# Patient Record
Sex: MALE | Race: OTHER
[De-identification: names, ages, dates, MRNs, and addresses within clinical notes are randomized per-mention and may not be internally consistent; named-entity substitution may affect disease eponyms.]

---

## 2021-04-11 ENCOUNTER — HOSPITAL ENCOUNTER (INPATIENT)
Dept: HOSPITAL 54 - ER | Age: 71
LOS: 5 days | Discharge: LEFT BEFORE DISCHARGE | DRG: 644 | End: 2021-04-16
Attending: INTERNAL MEDICINE | Admitting: NURSE PRACTITIONER
Payer: MEDICARE

## 2021-04-11 VITALS — BODY MASS INDEX: 36.94 KG/M2 | HEIGHT: 70 IN | WEIGHT: 258 LBS

## 2021-04-11 DIAGNOSIS — E11.9: ICD-10-CM

## 2021-04-11 DIAGNOSIS — D64.9: ICD-10-CM

## 2021-04-11 DIAGNOSIS — Z79.84: ICD-10-CM

## 2021-04-11 DIAGNOSIS — E83.42: ICD-10-CM

## 2021-04-11 DIAGNOSIS — Z20.822: ICD-10-CM

## 2021-04-11 DIAGNOSIS — E66.2: ICD-10-CM

## 2021-04-11 DIAGNOSIS — R91.8: ICD-10-CM

## 2021-04-11 DIAGNOSIS — Z87.891: ICD-10-CM

## 2021-04-11 DIAGNOSIS — J90: ICD-10-CM

## 2021-04-11 DIAGNOSIS — I16.0: ICD-10-CM

## 2021-04-11 DIAGNOSIS — E78.5: ICD-10-CM

## 2021-04-11 DIAGNOSIS — E22.2: Primary | ICD-10-CM

## 2021-04-11 LAB
BASOPHILS # BLD AUTO: 0.1 /CMM (ref 0–0.2)
BASOPHILS NFR BLD AUTO: 0.9 % (ref 0–2)
BUN SERPL-MCNC: 8 MG/DL (ref 7–18)
CALCIUM SERPL-MCNC: 8.7 MG/DL (ref 8.5–10.1)
CHLORIDE SERPL-SCNC: 81 MMOL/L (ref 98–107)
CO2 SERPL-SCNC: 20 MMOL/L (ref 21–32)
CREAT SERPL-MCNC: 0.7 MG/DL (ref 0.6–1.3)
EOSINOPHIL NFR BLD AUTO: 1.8 % (ref 0–6)
GLUCOSE SERPL-MCNC: 141 MG/DL (ref 74–106)
HCT VFR BLD AUTO: 41 % (ref 39–51)
HGB BLD-MCNC: 13.6 G/DL (ref 13.5–17.5)
LYMPHOCYTES NFR BLD AUTO: 1.4 /CMM (ref 0.8–4.8)
LYMPHOCYTES NFR BLD AUTO: 24.4 % (ref 20–44)
MCHC RBC AUTO-ENTMCNC: 34 G/DL (ref 31–36)
MCV RBC AUTO: 88 FL (ref 80–96)
MONOCYTES NFR BLD AUTO: 0.6 /CMM (ref 0.1–1.3)
MONOCYTES NFR BLD AUTO: 11.4 % (ref 2–12)
NEUTROPHILS # BLD AUTO: 3.5 /CMM (ref 1.8–8.9)
NEUTROPHILS NFR BLD AUTO: 61.5 % (ref 43–81)
PLATELET # BLD AUTO: 233 /CMM (ref 150–450)
POTASSIUM SERPL-SCNC: 3.8 MMOL/L (ref 3.5–5.1)
RBC # BLD AUTO: 4.63 MIL/UL (ref 4.5–6)
SODIUM SERPL-SCNC: 116 MMOL/L (ref 136–145)
WBC NRBC COR # BLD AUTO: 5.6 K/UL (ref 4.3–11)

## 2021-04-11 PROCEDURE — G0378 HOSPITAL OBSERVATION PER HR: HCPCS

## 2021-04-11 PROCEDURE — C9803 HOPD COVID-19 SPEC COLLECT: HCPCS

## 2021-04-11 NOTE — NUR
BIBRA FOR C/O HIGH BP AND OCCASIONAL SOB. DENIED H/A, DIZZINESS OR CP. REPORTED 
TAKING LOSARTAN BID AND HIS LAST DOSE WAS THIS MORNING, PT WAS PLACED IN RM 4 
ER AND WAS PLACED ON A MONITOR

## 2021-04-12 VITALS — DIASTOLIC BLOOD PRESSURE: 83 MMHG | SYSTOLIC BLOOD PRESSURE: 146 MMHG

## 2021-04-12 VITALS — DIASTOLIC BLOOD PRESSURE: 87 MMHG | SYSTOLIC BLOOD PRESSURE: 159 MMHG

## 2021-04-12 VITALS — DIASTOLIC BLOOD PRESSURE: 89 MMHG | SYSTOLIC BLOOD PRESSURE: 157 MMHG

## 2021-04-12 VITALS — SYSTOLIC BLOOD PRESSURE: 186 MMHG | DIASTOLIC BLOOD PRESSURE: 91 MMHG

## 2021-04-12 VITALS — DIASTOLIC BLOOD PRESSURE: 88 MMHG | SYSTOLIC BLOOD PRESSURE: 174 MMHG

## 2021-04-12 VITALS — DIASTOLIC BLOOD PRESSURE: 96 MMHG | SYSTOLIC BLOOD PRESSURE: 199 MMHG

## 2021-04-12 VITALS — SYSTOLIC BLOOD PRESSURE: 162 MMHG | DIASTOLIC BLOOD PRESSURE: 80 MMHG

## 2021-04-12 LAB
BASOPHILS # BLD AUTO: 0 /CMM (ref 0–0.2)
BASOPHILS NFR BLD AUTO: 0.3 % (ref 0–2)
BILIRUB UR QL STRIP: NEGATIVE
BUN SERPL-MCNC: 7 MG/DL (ref 7–18)
BUN SERPL-MCNC: 7 MG/DL (ref 7–18)
BUN SERPL-MCNC: 8 MG/DL (ref 7–18)
BUN SERPL-MCNC: 9 MG/DL (ref 7–18)
CALCIUM SERPL-MCNC: 7.6 MG/DL (ref 8.5–10.1)
CALCIUM SERPL-MCNC: 7.6 MG/DL (ref 8.5–10.1)
CALCIUM SERPL-MCNC: 7.7 MG/DL (ref 8.5–10.1)
CALCIUM SERPL-MCNC: 8.1 MG/DL (ref 8.5–10.1)
CHLORIDE SERPL-SCNC: 81 MMOL/L (ref 98–107)
CHLORIDE SERPL-SCNC: 83 MMOL/L (ref 98–107)
CHLORIDE SERPL-SCNC: 84 MMOL/L (ref 98–107)
CHLORIDE SERPL-SCNC: 84 MMOL/L (ref 98–107)
CHOLEST SERPL-MCNC: 90 MG/DL (ref ?–200)
CO2 SERPL-SCNC: 21 MMOL/L (ref 21–32)
CO2 SERPL-SCNC: 23 MMOL/L (ref 21–32)
CO2 SERPL-SCNC: 24 MMOL/L (ref 21–32)
CO2 SERPL-SCNC: 25 MMOL/L (ref 21–32)
COLOR UR: YELLOW
CREAT SERPL-MCNC: 0.6 MG/DL (ref 0.6–1.3)
CREAT SERPL-MCNC: 0.7 MG/DL (ref 0.6–1.3)
EOSINOPHIL NFR BLD AUTO: 0.3 % (ref 0–6)
GLUCOSE SERPL-MCNC: 136 MG/DL (ref 74–106)
GLUCOSE SERPL-MCNC: 153 MG/DL (ref 74–106)
GLUCOSE SERPL-MCNC: 157 MG/DL (ref 74–106)
GLUCOSE SERPL-MCNC: 178 MG/DL (ref 74–106)
GLUCOSE UR STRIP-MCNC: NEGATIVE MG/DL
HCT VFR BLD AUTO: 40 % (ref 39–51)
HDLC SERPL-MCNC: 51 MG/DL (ref 40–60)
HGB BLD-MCNC: 13.6 G/DL (ref 13.5–17.5)
LDLC SERPL DIRECT ASSAY-MCNC: 28 MG/DL (ref 0–99)
LEUKOCYTE ESTERASE UR QL STRIP: NEGATIVE
LYMPHOCYTES NFR BLD AUTO: 0.7 /CMM (ref 0.8–4.8)
LYMPHOCYTES NFR BLD AUTO: 8.8 % (ref 20–44)
MAGNESIUM SERPL-MCNC: 1.7 MG/DL (ref 1.8–2.4)
MAGNESIUM SERPL-MCNC: 2.1 MG/DL (ref 1.8–2.4)
MCHC RBC AUTO-ENTMCNC: 34 G/DL (ref 31–36)
MCV RBC AUTO: 88 FL (ref 80–96)
MONOCYTES NFR BLD AUTO: 0.5 /CMM (ref 0.1–1.3)
MONOCYTES NFR BLD AUTO: 7.2 % (ref 2–12)
NEUTROPHILS # BLD AUTO: 6.2 /CMM (ref 1.8–8.9)
NEUTROPHILS NFR BLD AUTO: 83.4 % (ref 43–81)
NITRITE UR QL STRIP: NEGATIVE
PH UR STRIP: 6 [PH] (ref 5–8)
PHOSPHATE SERPL-MCNC: 2.9 MG/DL (ref 2.5–4.9)
PHOSPHATE SERPL-MCNC: 2.9 MG/DL (ref 2.5–4.9)
PLATELET # BLD AUTO: 226 /CMM (ref 150–450)
POTASSIUM SERPL-SCNC: 3.5 MMOL/L (ref 3.5–5.1)
POTASSIUM SERPL-SCNC: 3.5 MMOL/L (ref 3.5–5.1)
POTASSIUM SERPL-SCNC: 3.8 MMOL/L (ref 3.5–5.1)
POTASSIUM SERPL-SCNC: 4.1 MMOL/L (ref 3.5–5.1)
PROT UR QL STRIP: 30 MG/DL
RBC # BLD AUTO: 4.6 MIL/UL (ref 4.5–6)
RBC #/AREA URNS HPF: (no result) /HPF (ref 0–2)
SODIUM SERPL-SCNC: 115 MMOL/L (ref 136–145)
SODIUM SERPL-SCNC: 116 MMOL/L (ref 136–145)
TRIGL SERPL-MCNC: 49 MG/DL (ref 30–150)
TSH SERPL DL<=0.005 MIU/L-ACNC: 1.5 UIU/ML (ref 0.36–3.74)
URATE SERPL-MCNC: 2.7 MG/DL (ref 2.6–7.2)
UROBILINOGEN UR STRIP-MCNC: 1 EU/DL
WBC #/AREA URNS HPF: (no result) /HPF (ref 0–3)
WBC NRBC COR # BLD AUTO: 7.5 K/UL (ref 4.3–11)

## 2021-04-12 RX ADMIN — ASPIRIN 81 MG SCH MG: 81 TABLET ORAL at 09:49

## 2021-04-12 RX ADMIN — INSULIN HUMAN PRN UNITS: 100 INJECTION, SOLUTION PARENTERAL at 12:09

## 2021-04-12 RX ADMIN — THERA TABS SCH UDTAB: TAB at 09:49

## 2021-04-12 RX ADMIN — INSULIN HUMAN PRN UNITS: 100 INJECTION, SOLUTION PARENTERAL at 17:17

## 2021-04-12 RX ADMIN — ATORVASTATIN CALCIUM SCH MG: 40 TABLET, FILM COATED ORAL at 17:11

## 2021-04-12 RX ADMIN — ALLOPURINOL SCH MG: 100 TABLET ORAL at 09:50

## 2021-04-12 RX ADMIN — Medication SCH EACH: at 12:09

## 2021-04-12 RX ADMIN — OXYCODONE HYDROCHLORIDE AND ACETAMINOPHEN SCH MG: 500 TABLET ORAL at 09:50

## 2021-04-12 RX ADMIN — INSULIN HUMAN PRN UNITS: 100 INJECTION, SOLUTION PARENTERAL at 21:14

## 2021-04-12 RX ADMIN — Medication SCH EACH: at 17:18

## 2021-04-12 RX ADMIN — Medication SCH EACH: at 21:14

## 2021-04-12 NOTE — NUR
RN ADMITTING NOTE



RECEIVED PATIENT FROM ER VIA [facility] AND 2 STAFF MEMBERS. PATIENT ACCOMPANIED TO ROOM 118-1. 
PATIENT IS ALERT AND ORIENTED X 4. ABLE TO MAKE NEEDS KNOWN. AMBULATORY WITH STEADY GAIT 
NOTED. RESPIRATIONS EVEN AND UNLABORED. NO C/O SOB, CHEST PAIN OR DIZZINESS NOTED. IV ACCESS 
TO LEFT HAND #18G INTACT AND PATENT. CONTINUES ON IVF. LABS TO BE DRAWN THIS AM. VS ON 
ADMISSION: /96 HR 86 RR 22 T 97.6 O2 SAT 96% ON ROOM AIR. WILL RECHECK BLOOD PRESSURE 
MANUALLY. PATIENT ORIENTED TO ROOM AND UNIT. CALL LIGHT WITHIN REACH. ASPIRATION, FALL AND 
SAFETY PRECAUTIONS MAINTAINED. WILL CONTINUE TO MONITOR. 

-------------------------------------------------------------------------------

Addendum: 04/12/21 at 0507 by GELA REDMOND RN

-------------------------------------------------------------------------------

ATTEMPTED TO CHECK SKIN UPON ADMISSION WITH PATIENT REFUSING TO REMOVE CLOTHES X 3 ATTEMPTS. 
PATIENT STATES HE HAS NO SKIN ISSUES.

## 2021-04-12 NOTE — NUR
RN NOTE

RECEIVED PT AWAKE AND ALERT/ORIENTED X 4. PT ON ROOM AIR, RESPIRATIONS UNLABORED, DENIES 
PAIN OR DISCOMFORT. PT WITH HOME CLOTHES. PER AM SHIFT, PT REFUSED FULL SKIN ASSESSMENT. 
OFFERED TO CHECK PT'S SKIN BUT OT REFUSED DESPITE EXPLANATION OF RISKS VS ADVANTAGES. PT IS 
NSR ON THE CARDIAC MONITOR. WITH LEFT HAND IV PATENT AND INTACT. PLAN OF CARE DISCUSSED, 
CALL LIGHT WITHIN REACH, SAFETY MEASURES IN PLACE PER PROTOCOL,

## 2021-04-12 NOTE — NUR
RN NOTES 

PT STILL REFUSING SKIN ASSESSMENT , VSS STABLE, NO SIGNIFICANT CHANGES NOTED ON THIS SHIFT , 
WILL ENDORSE TO NIGHT SHIFT NURSE FOR CONTINUITY OF CARE.

## 2021-04-12 NOTE — NUR
RN NOTES 

DR BROWN  AND YUE NP NOITIFED REGARDING NA =116 , NO NEW ORDER GIVEN , CONTINUE TO 
MONITOR.

## 2021-04-12 NOTE — NUR
RN NOTE



RECEIVED NEW ORDER TO ADMINISTER ONE TIME DOSE OF PATIENTS HOME MEDICATION: LOSARTAN 
POTASSIUM 25MG. ORDER INPUTTED AND CARRIED OUT. WILL RECHECK BP.

## 2021-04-12 NOTE — NUR
RN NOTES



RECEIVED CALL FROM LAB WITH CRITICAL LAB VALUE OF SODIUM 115, CHLORIDE 81, GLUCOSE 153 AND 
CALCIUM 7.6. BP CONTINUES TO BE ELEVATED WITH AUTOMATIC CUFF. BP /101. RECHECK WITH 
MANUAL CUFF AND BP /80. REPORTED LAB VALUES AND BLOOD PRESSURE TO ON CALL MD ZHU 
WITH NO NEW ORDERS AT THIS TIME. PATIENT HAS BMP LAB DRAW Q4HRS.

## 2021-04-12 NOTE — NUR
RN NOTE



PATIENTS MANUAL BLOOD PRESSURE READING /91. CONTACTED ON CALL MD ZHU. AWAITING 
CALL BACK.

## 2021-04-12 NOTE — NUR
RN NOTES 

RECEIVED PT ON BED, A/Ox4, ON 2L O2 N/C , O2 SAT WNL, ON TELE SR HR IN 70'S , L HAND IV SITE 
CLEAN, DRY AND INTACT, NS AT 100CC/HR RUNNING , SR UP x3, CALL LIGHT WITHIN EASY REACH, BED 
LOCKED AND IN LOWEST POSITION, CONTINUE TO MONITOR .

## 2021-04-12 NOTE — NUR
RN CLOSING NOTE



PATIENT CURRENTLY SLEEPING IN BED. ALERT AND ORIENTED X 4. ABLE TO MAKE NEEDS KNOWN. NO 
COMPLAINTS OF PAIN AT THIS TIME CONTINUES ON ROOM AIR WITH NO SIGNS OR SYMPTOMS OF 
RESPIRATORY DISTRESS NOTED. IV ACCESS TO LEFT HAND #18G INTACT AND PATENT. CONTINUES ON IV 
NS @ 1OOMLS/HR X 1L. NO COMPLAINTS OF CHEST PAIN, SOB, DIZZINESS OR CHANGE IN LOC. TELE 
MONITOR READING SR. CALL LIGHT WITHIN REACH. ASPIRATION, FALL AND SAFETY PRECAUTIONS 
MAINTAINED. WILL ENDORSE PLAN OF CARE TO ONCOMING SHIFT.

## 2021-04-13 VITALS — DIASTOLIC BLOOD PRESSURE: 75 MMHG | SYSTOLIC BLOOD PRESSURE: 143 MMHG

## 2021-04-13 VITALS — SYSTOLIC BLOOD PRESSURE: 108 MMHG | DIASTOLIC BLOOD PRESSURE: 66 MMHG

## 2021-04-13 VITALS — DIASTOLIC BLOOD PRESSURE: 90 MMHG | SYSTOLIC BLOOD PRESSURE: 160 MMHG

## 2021-04-13 VITALS — DIASTOLIC BLOOD PRESSURE: 96 MMHG | SYSTOLIC BLOOD PRESSURE: 157 MMHG

## 2021-04-13 VITALS — DIASTOLIC BLOOD PRESSURE: 83 MMHG | SYSTOLIC BLOOD PRESSURE: 153 MMHG

## 2021-04-13 VITALS — DIASTOLIC BLOOD PRESSURE: 81 MMHG | SYSTOLIC BLOOD PRESSURE: 141 MMHG

## 2021-04-13 LAB
BUN SERPL-MCNC: 7 MG/DL (ref 7–18)
BUN SERPL-MCNC: 7 MG/DL (ref 7–18)
CALCIUM SERPL-MCNC: 7.8 MG/DL (ref 8.5–10.1)
CALCIUM SERPL-MCNC: 8.2 MG/DL (ref 8.5–10.1)
CHLORIDE SERPL-SCNC: 84 MMOL/L (ref 98–107)
CHLORIDE SERPL-SCNC: 86 MMOL/L (ref 98–107)
CO2 SERPL-SCNC: 23 MMOL/L (ref 21–32)
CO2 SERPL-SCNC: 24 MMOL/L (ref 21–32)
CREAT SERPL-MCNC: 0.6 MG/DL (ref 0.6–1.3)
CREAT SERPL-MCNC: 0.6 MG/DL (ref 0.6–1.3)
GLUCOSE SERPL-MCNC: 132 MG/DL (ref 74–106)
GLUCOSE SERPL-MCNC: 148 MG/DL (ref 74–106)
POTASSIUM SERPL-SCNC: 3.4 MMOL/L (ref 3.5–5.1)
POTASSIUM SERPL-SCNC: 3.7 MMOL/L (ref 3.5–5.1)
SODIUM SERPL-SCNC: 117 MMOL/L (ref 136–145)
SODIUM SERPL-SCNC: 117 MMOL/L (ref 136–145)

## 2021-04-13 RX ADMIN — INSULIN HUMAN PRN UNITS: 100 INJECTION, SOLUTION PARENTERAL at 08:08

## 2021-04-13 RX ADMIN — Medication SCH EACH: at 11:19

## 2021-04-13 RX ADMIN — ALLOPURINOL SCH MG: 100 TABLET ORAL at 08:15

## 2021-04-13 RX ADMIN — ATORVASTATIN CALCIUM SCH MG: 40 TABLET, FILM COATED ORAL at 17:06

## 2021-04-13 RX ADMIN — INSULIN HUMAN PRN UNITS: 100 INJECTION, SOLUTION PARENTERAL at 13:10

## 2021-04-13 RX ADMIN — INSULIN HUMAN PRN UNITS: 100 INJECTION, SOLUTION PARENTERAL at 17:07

## 2021-04-13 RX ADMIN — THERA TABS SCH UDTAB: TAB at 08:06

## 2021-04-13 RX ADMIN — ASPIRIN 81 MG SCH MG: 81 TABLET ORAL at 08:06

## 2021-04-13 RX ADMIN — Medication SCH EACH: at 17:11

## 2021-04-13 RX ADMIN — Medication SCH EACH: at 07:44

## 2021-04-13 RX ADMIN — Medication SCH EACH: at 22:31

## 2021-04-13 RX ADMIN — OXYCODONE HYDROCHLORIDE AND ACETAMINOPHEN SCH MG: 500 TABLET ORAL at 08:06

## 2021-04-13 NOTE — NUR
RN NOTE

NO ACUTE CHANGES OBSERVED OVERNIGHT. PT SLEEPING IN BED BUT EASILY AROUSABLE, ON 2L OF O2 
VIA NC. RESPIRATIONS UNLABORED, NO SIGNS OF PAIN OR DISCOMFORT, PT IS NSR ON THE CARDIAC 
MONITOR. WITH LEFT HAND IV PATENT AND INTACT. ALL NEEDS MET AND ATTENDED TO, CALL LIGHT 
WITHIN REACH, SAFETY MEASURES IN PLACE PER PROTOCOL, WILL ENDORSE TO MORNING RN FOR TYLER.

## 2021-04-13 NOTE — NUR
TELE RN NOTE

 PATIENT ON 2L NC, NO SOB NOTED AT THIS TIME, ON IVF AS ORDERED REFUSED TO EAT ,ENCOURAGE TO 
EAT BUT STILL REFUSING, WILL CONT TO F\U , RH HAND HL INSERTED WITH  GOOD BLOOD RETURN, BED 
IN LOWEST AND LOCKED POSITION , CALL LIGHT WITHIN REACH

## 2021-04-13 NOTE — NUR
TELE RN NOTE 

CALLED TO DR JOAO ESQUIVEL NEPHROLOGIST  VERIFIED  3%NACL  IVF , STATED ITS OK TO STOP 
NOW , ORDER CARRIED OUT

## 2021-04-13 NOTE — NUR
RN OPENING NOTES:



RECEIVED PT A/OX4 IN BED RESTING/SLEEPING COMFORTABLY. PATIENT IN NO S/SX OF ACUTE DISTRESS 
AT THIS TIME. NO SOB NOTED. PATIENT'S BREATHING IS EVEN AND UNLABORED. 

PATIENT IS ON 2L OF OXYGEN VIA NC; TOLERATING WELL. PATIENT ON TELE MONITORING READING SINUS 
RHYTHM HR IS @76 AT THE TIME OF RECEIVED.  PATIENT ON REGULAR DIET; TOLERATES WELL. 

NOTED IV SITE ON R HAND #22; PATENT, INTACT AND FLUSHING WELL; NO S/S OF INFECTION OR 
INFILTRATION. SAFETY MEASURES HAVE BEEN PROVIDED AND IMPLEMENTED. PATIENT BED ALARM IS ON. 
HEAD OF BED ELEVATED. BED IS LOCKED,  IN LOWEST POSITION AND SIDE RAILS UP. CALL LIGHT 
WITHIN REACH OF THE PATIENT. APPLICABLE ISOLATION PRECAUTIONS IN PLACE. WILL CONTINUE TO 
MONITOR AND REASSESS FOR ANY CHANGES AND WILL CARRY OUT ANY ONGOING AND ACTIVE MD ORDER.

## 2021-04-13 NOTE — NUR
ROXANA/RN

ASSUMED CARE FOR CONTINUITY OF CARE, PATIENT WAS AWAKE, ALERT, ORIENTED, COMFORTABLE, NO C/O 
PAIN, NO DISTRESS NOTED, CALL LIGHT IN REACH, NEEDS ATTENDED AT THIS TIME, WILL MONITOR.

## 2021-04-13 NOTE — NUR
TELE RN NOTE

 SEEN BY DR NIETO UPDATED PATIENT CONDITION, WILL HAVE CT SCAN SOON, AWARE THAT PATIENT HAS 
SLIGHT SOB WILL F\U

-------------------------------------------------------------------------------

Addendum: 04/13/21 at 1059 by FLAKITO CRAWLEY RN

-------------------------------------------------------------------------------

 liliana rn note ct chest done as ordered

## 2021-04-13 NOTE — NUR
TELE RN NOTE

 REPORTED RESULT OF  CT CHEST TO DR NIETO, NO NEW ORDER AT THIS TIME PATENT WII HAVE 
THORACENTESIS TOMORROW

## 2021-04-13 NOTE — NUR
RN TELE  NOTE

PATIENT IN BED , ALERT ORIENTED X4 PT SLEEPING IN BED BUT EASILY AROUSABLE, ON 2L OF O2 VIA 
NC.  WITH SLIGHT SOB NOTED AT THIS TIME , NO SIGNS OF PAIN OR DISCOMFORT, PT IS NSR ON THE 
CARDIAC MONITOR HR 73  WITH LEFT HAND IV PATENT AND INTACT. ALL NEEDS MET AND ATTENDED TO, 
CALL LIGHT WITHIN REACH, SAFETY MEASURES IN PLACED  BED IN LOWEST AND LOCKED POSITION. WILL 
CONT TO MONITOR

## 2021-04-14 VITALS — DIASTOLIC BLOOD PRESSURE: 77 MMHG | SYSTOLIC BLOOD PRESSURE: 144 MMHG

## 2021-04-14 VITALS — SYSTOLIC BLOOD PRESSURE: 150 MMHG | DIASTOLIC BLOOD PRESSURE: 67 MMHG

## 2021-04-14 VITALS — DIASTOLIC BLOOD PRESSURE: 83 MMHG | SYSTOLIC BLOOD PRESSURE: 132 MMHG

## 2021-04-14 VITALS — DIASTOLIC BLOOD PRESSURE: 88 MMHG | SYSTOLIC BLOOD PRESSURE: 153 MMHG

## 2021-04-14 VITALS — SYSTOLIC BLOOD PRESSURE: 148 MMHG | DIASTOLIC BLOOD PRESSURE: 79 MMHG

## 2021-04-14 VITALS — DIASTOLIC BLOOD PRESSURE: 81 MMHG | SYSTOLIC BLOOD PRESSURE: 141 MMHG

## 2021-04-14 LAB
BUN SERPL-MCNC: 6 MG/DL (ref 7–18)
BUN SERPL-MCNC: 6 MG/DL (ref 7–18)
BUN SERPL-MCNC: 8 MG/DL (ref 7–18)
CALCIUM SERPL-MCNC: 7.8 MG/DL (ref 8.5–10.1)
CALCIUM SERPL-MCNC: 8.3 MG/DL (ref 8.5–10.1)
CALCIUM SERPL-MCNC: 8.4 MG/DL (ref 8.5–10.1)
CHLORIDE SERPL-SCNC: 86 MMOL/L (ref 98–107)
CHLORIDE SERPL-SCNC: 86 MMOL/L (ref 98–107)
CHLORIDE SERPL-SCNC: 87 MMOL/L (ref 98–107)
CO2 SERPL-SCNC: 22 MMOL/L (ref 21–32)
CO2 SERPL-SCNC: 23 MMOL/L (ref 21–32)
CO2 SERPL-SCNC: 27 MMOL/L (ref 21–32)
CREAT SERPL-MCNC: 0.6 MG/DL (ref 0.6–1.3)
GLUCOSE SERPL-MCNC: 128 MG/DL (ref 74–106)
GLUCOSE SERPL-MCNC: 140 MG/DL (ref 74–106)
GLUCOSE SERPL-MCNC: 216 MG/DL (ref 74–106)
POTASSIUM SERPL-SCNC: 3.7 MMOL/L (ref 3.5–5.1)
POTASSIUM SERPL-SCNC: 4 MMOL/L (ref 3.5–5.1)
POTASSIUM SERPL-SCNC: 4.2 MMOL/L (ref 3.5–5.1)
SODIUM SERPL-SCNC: 118 MMOL/L (ref 136–145)

## 2021-04-14 PROCEDURE — 0W993ZZ DRAINAGE OF RIGHT PLEURAL CAVITY, PERCUTANEOUS APPROACH: ICD-10-PCS

## 2021-04-14 RX ADMIN — Medication SCH EACH: at 16:31

## 2021-04-14 RX ADMIN — OXYCODONE HYDROCHLORIDE AND ACETAMINOPHEN SCH MG: 500 TABLET ORAL at 09:26

## 2021-04-14 RX ADMIN — Medication SCH EACH: at 22:01

## 2021-04-14 RX ADMIN — THERA TABS SCH UDTAB: TAB at 09:26

## 2021-04-14 RX ADMIN — SODIUM CHLORIDE SCH MLS/HR: 3 INJECTION, SOLUTION INTRAVENOUS at 11:10

## 2021-04-14 RX ADMIN — CLONIDINE HYDROCHLORIDE PRN MG: 0.1 TABLET ORAL at 02:49

## 2021-04-14 RX ADMIN — SODIUM CHLORIDE SCH MLS/HR: 3 INJECTION, SOLUTION INTRAVENOUS at 22:02

## 2021-04-14 RX ADMIN — ALLOPURINOL SCH MG: 100 TABLET ORAL at 09:26

## 2021-04-14 RX ADMIN — Medication SCH EACH: at 09:25

## 2021-04-14 RX ADMIN — ATORVASTATIN CALCIUM SCH MG: 40 TABLET, FILM COATED ORAL at 16:32

## 2021-04-14 RX ADMIN — Medication SCH EACH: at 12:23

## 2021-04-14 RX ADMIN — ASPIRIN 81 MG SCH MG: 81 TABLET ORAL at 09:00

## 2021-04-14 NOTE — NUR
rn notes

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



There is partial consolidation of the right upper lobe up to 6.5 cm diameter. This could 
represent acute pneumonia. There is also partial consolidation in the RML.



There is a moderate-sized right pleural effusion.



There is mild atelectasis or scarring in the left lung but there is no left lung mass or 
consolidation.



There is marked mediastinal lymph node enlargement involving multiple nodes. These nodes are 
probably involved by metastatic right lung tumor.

## 2021-04-14 NOTE — NUR
RN  NOTES

 PATIENT  HAVING BEDSIDE PROCEDURE THORACENTESIS AT THIS TIME ON RIGHT LUNG, VIA RADIOLOGIST 
Dr GANN , CHEST X-RAY ORDERED.

## 2021-04-14 NOTE — NUR
RN NOTES 

RECEIVED PATIENT IN THE BED,  A/O X4, ON O2-2L NC, NO ACUTE RESPIRATORY  DISTRESS, PATIENT 
NPO AT THIS TIME,EXCEPT MEDICATION,  PATIENT HAS DISTENDED ABDOMEN.  RIGHT SIDE PLEURAL 
INFUSION, AND SCHEDULED BEDSIDE PROCEDURE TODAY.  PATIENT REFUSED PAIN, GET CALL FROM LAB NA 
LEVEL , Dr BRIZUELA AWARE OF. CALL LIGHT WITHIN TO REACH. WILL MONITOR.

## 2021-04-14 NOTE — NUR
TELE RN NOTE

 PT NOTED AWAKE IN HIS ROOM AND WANTS TO AMA AT THIS TIME. NOTED PT WITH ANXIETY AND SOB 
PLUS NOTED BP HIGH 188/91. LEIDY NP  INFORMED, RECEIVED NEW ORDER, ORDER NOTED AND CARRIED 
OUT.

## 2021-04-14 NOTE — NUR
ROXANA/RN

PATIENT IS SLEEPING AT THIS THIS TIME, APPEAR COMFORTABLE, NO DISTRESS NOTED, CALL LIGHT IN 
REACH, WILL CONTINUE TO MONITOR

## 2021-04-14 NOTE — NUR
RN NOTE



PATIENT REFUSED ALPRAZOLAM 0.25 MG ORDERED ONCE SCHEDULED FOR . PATIENT UNABLE TO SLEEP 
AT 0 AND REQUESTED FOR ALPRAZOLAM 0.25 MG, HOWEVER, ORDER HAS , NOTIFIED [facility] ON[facility]CALL [facility], SPOKE TO MANOHAR, STATED ORDER  AND NEEDS A NEW ORDER. DR FORBES NOTIFIED, RESPONDED, PLEASE REPEAT ORDER. CHARGE RN MADE AWARE.

## 2021-04-14 NOTE — NUR
TELE RN NOTE

ATIVAN 2 MG PO GIVEN FOR ANXIETY AND CATAPRESS 0.1 MG PO GIVEN FOR HIGH BP. ALSO STAT BMP 
DONE, DUE TO LOW NA AND 3% NA IVPB IS STILL NOT COMPLETELY GIVEN.

## 2021-04-14 NOTE — NUR
TELE RN NOTE

PT NA LEVEL CAME . NP INFORMED AND PER DMETRIY CONTINUE TO INFUSE REST OF 3 % NA IV 
BAG AT 30 ML/HR. INFOMED THE PT AND HIS FRIEND MARTHA WHO CAME TO  THE PT AT 0250. 
AFTER CONVINCING HIM PT DECIDED TO STAY. NURSING SUPERVISOR INFORMED. B/P 162/78 AND ANXIETY 
SUBSIDED. CONTINUE TO MONITOR HIM.

## 2021-04-14 NOTE — NUR
ROXANA/RN

PATIENT IS AWAKE, 0600 MED GIVEN, ALL NEEDS ATTENDED AT THIS TIME, WILL CONTINUE TO MONITOR

## 2021-04-14 NOTE — NUR
RN NOTES

 PATIENT'S  GIRLFRIEND, AND SISTER  NAME - [phone]  ASKING  TO TRANSFER PATIENT 
ANOTHER HOSPITAL  ONCOLOGY UNIT, FOR MORE STUDY. HOSPITALIST Dr BRIZUELA AWARE OF.

## 2021-04-14 NOTE — NUR
rn notes

 patient anxious, asking anxiety medication,  notified Dr Bennett  for orders and get TO  
Xanax 0.25 mg x1 now. order taken and carried out. endorsed oncoming nurse follow plan of 
care.

## 2021-04-14 NOTE — NUR
RN NOTES

 FINISHED BEDSIDE PROCEDURE  REMOVED 800 ML OF FLUID FROM RIGHT LUNG VIA BIJAL Sutherland 
FOR TO TAKE SPECIMEN TO THE LAB FOR CATALOGY.

## 2021-04-14 NOTE — NUR
RN NOTES

GET CHEST RESULT  BACK, AND DR EVANS EXPLAINED PATIENT  RESULT.  BS-133 MG/ DL 
PATIENT REFUSED COVERAGE .  PATIENT STATE" I  HAS FLASHES AFTER INSULIN INJECTION , AND 
REFUSING".  MD NOTIFIED.

## 2021-04-14 NOTE — NUR
rn notes

 BS-154  MG/DL REFUSED COVERAGE , PATIENT STATE HE HAS FLASHES AFTER GETTING INSULIN, 
HOSPITALIST PAGED FOR NEW ORDERS.

 GET BACK CALL AND NEW ORDER IS DO NOT ADMINISTER. ORDER TAKEN AND CARRIED OUT.

## 2021-04-14 NOTE — NUR
RN NOTE



RECEIVED PATIENT IN BED, AO X4, IN NO S/SX OF ACUTE DISTRESS AT THIS TIME, BREATHING IS EVEN 
AND UNLABORED, ON 2 L OF OXYGEN VIA NC, SATURATION AT 96%, HR IS 66. NOTED IV SITE AT R HAND 
22G, PATENT AND FLUSHING WELL, NO S/S OF INFECTION OR INFILTRATION, WITH 3% NS INFUSING AT 
30 ML/HR. PATIENT IS AMBULATORY, URINAL AT BEDSIDE. SAFETY MEASURES IMPLEMENTED. PATIENT BED 
ALARM IS ON. HEAD OF BED ELEVATED. BED IS LOCKED,  IN LOWEST POSITION AND SIDE RAILS UP. 
CALL LIGHT WITHIN REACH OF THE PATIENT. WILL CONTINUE TO MONITOR AND REASSESS FOR ANY 
CHANGES.

## 2021-04-15 VITALS — DIASTOLIC BLOOD PRESSURE: 66 MMHG | SYSTOLIC BLOOD PRESSURE: 140 MMHG

## 2021-04-15 VITALS — DIASTOLIC BLOOD PRESSURE: 76 MMHG | SYSTOLIC BLOOD PRESSURE: 138 MMHG

## 2021-04-15 LAB
BUN SERPL-MCNC: 6 MG/DL (ref 7–18)
BUN SERPL-MCNC: 7 MG/DL (ref 7–18)
CALCIUM SERPL-MCNC: 7.5 MG/DL (ref 8.5–10.1)
CALCIUM SERPL-MCNC: 8 MG/DL (ref 8.5–10.1)
CHLORIDE SERPL-SCNC: 91 MMOL/L (ref 98–107)
CHLORIDE SERPL-SCNC: 94 MMOL/L (ref 98–107)
CO2 SERPL-SCNC: 25 MMOL/L (ref 21–32)
CO2 SERPL-SCNC: 25 MMOL/L (ref 21–32)
CREAT SERPL-MCNC: 0.6 MG/DL (ref 0.6–1.3)
CREAT SERPL-MCNC: 0.7 MG/DL (ref 0.6–1.3)
GLUCOSE SERPL-MCNC: 123 MG/DL (ref 74–106)
GLUCOSE SERPL-MCNC: 167 MG/DL (ref 74–106)
POTASSIUM SERPL-SCNC: 3.5 MMOL/L (ref 3.5–5.1)
POTASSIUM SERPL-SCNC: 3.8 MMOL/L (ref 3.5–5.1)
SODIUM SERPL-SCNC: 125 MMOL/L (ref 136–145)
SODIUM SERPL-SCNC: 125 MMOL/L (ref 136–145)

## 2021-04-15 RX ADMIN — ALLOPURINOL SCH MG: 100 TABLET ORAL at 08:57

## 2021-04-15 RX ADMIN — ASPIRIN 81 MG SCH MG: 81 TABLET ORAL at 09:00

## 2021-04-15 RX ADMIN — ATORVASTATIN CALCIUM SCH MG: 40 TABLET, FILM COATED ORAL at 17:34

## 2021-04-15 RX ADMIN — OXYCODONE HYDROCHLORIDE AND ACETAMINOPHEN SCH MG: 500 TABLET ORAL at 09:07

## 2021-04-15 RX ADMIN — SODIUM CHLORIDE SCH MLS/HR: 3 INJECTION, SOLUTION INTRAVENOUS at 12:43

## 2021-04-15 RX ADMIN — ASPIRIN 81 MG SCH MG: 81 TABLET ORAL at 08:57

## 2021-04-15 RX ADMIN — Medication SCH EACH: at 07:48

## 2021-04-15 RX ADMIN — Medication SCH EACH: at 23:45

## 2021-04-15 RX ADMIN — THERA TABS SCH UDTAB: TAB at 08:57

## 2021-04-15 RX ADMIN — Medication SCH EACH: at 17:34

## 2021-04-15 RX ADMIN — Medication SCH EACH: at 11:54

## 2021-04-15 NOTE — NUR
MS RN OPENING NOTES



RECEIVED PATIENT IN BED, AWAKE, A/O X4. NO SIGNS OF PAIN OR ACUTE DISTRESS AT THIS TIME. 
PATIENT ON NASAL CANNULA @ 2LPM. BREATHING IS EVEN AND UNLABORED. IV ACCESS IN R HAND #22G, 
PATENT AND INTACT - RUNNING 3% NS @ 30ML/HR. SAFETY PRECAUTIONS IN PLACE. PATIENT BED ALARM 
IS ON. HEAD OF BED ELEVATED. BED LOCKED AND IN LOWEST POSITION. SIDE RAILS X2. CALL LIGHT 
WITHIN REACH. WILL CONTINUE TO MONITOR.

## 2021-04-15 NOTE — NUR
RN NOTE



PATIENT REMAINS IN BED, NO ACUTE DISTRESS NOTED, SATURATION >95% ON ROOM AIR, 3% NS INFUSING 
AT 35 ML/HR VIA L HAND 22G. SAFETY MEASURES IMPLEMENTED. CALL LIGHT WITHIN REACH OF THE 
PATIENT. ENDORSED TO VON RN FOR CONTINUATION OF CARE.

## 2021-04-15 NOTE — NUR
RN CLOSING NOTE



PATIENT CURRENTLY IN BED, RESTING. A/O X4. NO ACUTE DISTRESS NOTED, NO PAIN NOTED. ON 2LPM 
O2 VIA NASAL CANNULA - TOLERATING WELL. IV IN LEFT HAND #22 PATENT AND INTACT. SAFETY 
PRECAUTIONS IN PLACE. CALL LIGHT WITHIN REACH. WILL ENDORSE TO NIGHT SHIFT NURSE FOR TYLER.

## 2021-04-16 VITALS — DIASTOLIC BLOOD PRESSURE: 82 MMHG | SYSTOLIC BLOOD PRESSURE: 181 MMHG

## 2021-04-16 VITALS — DIASTOLIC BLOOD PRESSURE: 84 MMHG | SYSTOLIC BLOOD PRESSURE: 160 MMHG

## 2021-04-16 LAB
ALBUMIN SERPL BCP-MCNC: 3.3 G/DL (ref 3.4–5)
ALP SERPL-CCNC: 115 U/L (ref 46–116)
ALT SERPL W P-5'-P-CCNC: 51 U/L (ref 12–78)
AST SERPL W P-5'-P-CCNC: 47 U/L (ref 15–37)
BASOPHILS # BLD AUTO: 0 /CMM (ref 0–0.2)
BASOPHILS NFR BLD AUTO: 0.5 % (ref 0–2)
BILIRUB SERPL-MCNC: 1 MG/DL (ref 0.2–1)
BUN SERPL-MCNC: 5 MG/DL (ref 7–18)
CALCIUM SERPL-MCNC: 7.9 MG/DL (ref 8.5–10.1)
CHLORIDE SERPL-SCNC: 93 MMOL/L (ref 98–107)
CO2 SERPL-SCNC: 26 MMOL/L (ref 21–32)
CREAT SERPL-MCNC: 0.6 MG/DL (ref 0.6–1.3)
EOSINOPHIL NFR BLD AUTO: 1.4 % (ref 0–6)
GLUCOSE SERPL-MCNC: 133 MG/DL (ref 74–106)
HCT VFR BLD AUTO: 39 % (ref 39–51)
HGB BLD-MCNC: 13.2 G/DL (ref 13.5–17.5)
LYMPHOCYTES NFR BLD AUTO: 1.1 /CMM (ref 0.8–4.8)
LYMPHOCYTES NFR BLD AUTO: 18.1 % (ref 20–44)
MAGNESIUM SERPL-MCNC: 1.9 MG/DL (ref 1.8–2.4)
MCHC RBC AUTO-ENTMCNC: 34 G/DL (ref 31–36)
MCV RBC AUTO: 88 FL (ref 80–96)
MONOCYTES NFR BLD AUTO: 0.7 /CMM (ref 0.1–1.3)
MONOCYTES NFR BLD AUTO: 11.7 % (ref 2–12)
NEUTROPHILS # BLD AUTO: 4.3 /CMM (ref 1.8–8.9)
NEUTROPHILS NFR BLD AUTO: 68.3 % (ref 43–81)
PHOSPHATE SERPL-MCNC: 3.1 MG/DL (ref 2.5–4.9)
PLATELET # BLD AUTO: 238 /CMM (ref 150–450)
POTASSIUM SERPL-SCNC: 3.9 MMOL/L (ref 3.5–5.1)
PROT SERPL-MCNC: 7 G/DL (ref 6.4–8.2)
RBC # BLD AUTO: 4.44 MIL/UL (ref 4.5–6)
SODIUM SERPL-SCNC: 129 MMOL/L (ref 136–145)
WBC NRBC COR # BLD AUTO: 6.3 K/UL (ref 4.3–11)

## 2021-04-16 RX ADMIN — CLONIDINE HYDROCHLORIDE PRN MG: 0.1 TABLET ORAL at 15:18

## 2021-04-16 RX ADMIN — ASPIRIN 81 MG SCH MG: 81 TABLET ORAL at 09:16

## 2021-04-16 RX ADMIN — Medication SCH EACH: at 08:18

## 2021-04-16 RX ADMIN — Medication SCH EACH: at 12:13

## 2021-04-16 RX ADMIN — THERA TABS SCH UDTAB: TAB at 09:16

## 2021-04-16 RX ADMIN — ALLOPURINOL SCH MG: 100 TABLET ORAL at 09:16

## 2021-04-16 RX ADMIN — OXYCODONE HYDROCHLORIDE AND ACETAMINOPHEN SCH MG: 500 TABLET ORAL at 09:16

## 2021-04-16 NOTE — NUR
RN OPENING NOTES  

PRESENT IN BED, A/OX4, ON 2L OF O2, NO SOB, DISTRESS NOTED, SPO2 100%, NO,PAIN REPORTED, 

IV  R HAND #22G, PATENT AND INTACT. SAFETY PRECAUTIONS IN PLACE. PATIENT BED ALARM IS ON. 
HEAD OF BED ELEVATED. BED LOCKED AND IN LOWEST POSITION. SIDE RAILS X2. CALL LIGHT WITHIN 
REACH. WILL CONTINUE TO MONITOR.

## 2021-04-16 NOTE — NUR
RN notes



Alert and oriented x 4, ambulatory. In bed watching TV with no respiratory distress. 
Breathing even and unlabored. On room air tolerating well. Complaining of eye soreness, 
washed and cleanse. Patient said soreness went away. Kept clean and dry. Will endorse to 
next shift for continuity of care.

## 2021-04-16 NOTE — NUR
escorted to the car via wheelchair with his wife, provided discharge instruction, IV and ID 
band removed

## 2021-04-16 NOTE — NUR
patient wiling to [facility], hospitalist notifyed,MD Notified, education provided,risks explained, 
still willing to leave to [facility]  will prepare for discharge